# Patient Record
Sex: FEMALE | Race: WHITE | NOT HISPANIC OR LATINO | Employment: OTHER | URBAN - NONMETROPOLITAN AREA
[De-identification: names, ages, dates, MRNs, and addresses within clinical notes are randomized per-mention and may not be internally consistent; named-entity substitution may affect disease eponyms.]

---

## 2023-04-04 PROBLEM — D16.21 OSTEOCHONDROMA OF RIGHT FEMUR: Status: ACTIVE | Noted: 2023-04-04

## 2023-04-04 PROBLEM — I48.0 AF (PAROXYSMAL ATRIAL FIBRILLATION) (MULTI): Status: ACTIVE | Noted: 2023-04-04

## 2023-04-04 PROBLEM — R55 VASOVAGAL SYNCOPE: Status: ACTIVE | Noted: 2023-04-04

## 2023-04-04 PROBLEM — L60.3 DYSTROPHIC NAIL: Status: ACTIVE | Noted: 2023-04-04

## 2023-04-04 PROBLEM — M79.604 BILATERAL LEG AND FOOT PAIN: Status: ACTIVE | Noted: 2023-04-04

## 2023-04-04 PROBLEM — R63.5 EXCESSIVE WEIGHT GAIN: Status: ACTIVE | Noted: 2023-04-04

## 2023-04-04 PROBLEM — G89.29 CHRONIC PAIN OF RIGHT KNEE: Status: ACTIVE | Noted: 2023-04-04

## 2023-04-04 PROBLEM — R10.9 RIGHT FLANK PAIN: Status: ACTIVE | Noted: 2023-04-04

## 2023-04-04 PROBLEM — I87.8 STASIS, VENOUS: Status: ACTIVE | Noted: 2023-04-04

## 2023-04-04 PROBLEM — M19.91 PRIMARY OSTEOARTHRITIS: Status: ACTIVE | Noted: 2023-04-04

## 2023-04-04 PROBLEM — M25.561 CHRONIC PAIN OF RIGHT KNEE: Status: ACTIVE | Noted: 2023-04-04

## 2023-04-04 PROBLEM — F32.A DEPRESSION: Status: ACTIVE | Noted: 2023-04-04

## 2023-04-04 PROBLEM — H27.02 APHAKIA OF LEFT EYE: Status: ACTIVE | Noted: 2023-04-04

## 2023-04-04 PROBLEM — H33.21 RIGHT RETINAL DETACHMENT: Status: ACTIVE | Noted: 2023-04-04

## 2023-04-04 PROBLEM — R53.83 FATIGUE: Status: ACTIVE | Noted: 2023-04-04

## 2023-04-04 PROBLEM — M23.91 INTERNAL DERANGEMENT OF RIGHT KNEE: Status: ACTIVE | Noted: 2023-04-04

## 2023-04-04 PROBLEM — M79.605 BILATERAL LEG AND FOOT PAIN: Status: ACTIVE | Noted: 2023-04-04

## 2023-04-04 PROBLEM — B35.1 MYCOTIC TOENAILS: Status: ACTIVE | Noted: 2023-04-04

## 2023-04-04 PROBLEM — I10 ESSENTIAL HYPERTENSION: Status: ACTIVE | Noted: 2023-04-04

## 2023-04-04 PROBLEM — I35.0 AORTIC VALVE STENOSIS, MILD: Status: ACTIVE | Noted: 2023-04-04

## 2023-04-04 PROBLEM — M79.672 BILATERAL LEG AND FOOT PAIN: Status: ACTIVE | Noted: 2023-04-04

## 2023-04-04 PROBLEM — R01.1 SYSTOLIC MURMUR: Status: ACTIVE | Noted: 2023-04-04

## 2023-04-04 PROBLEM — M79.671 BILATERAL LEG AND FOOT PAIN: Status: ACTIVE | Noted: 2023-04-04

## 2023-04-04 PROBLEM — D64.9 ANEMIA: Status: ACTIVE | Noted: 2023-04-04

## 2023-04-04 RX ORDER — CITALOPRAM 40 MG/1
1 TABLET, FILM COATED ORAL DAILY
COMMUNITY
Start: 2014-07-11 | End: 2023-07-24

## 2023-04-04 RX ORDER — HYDROCHLOROTHIAZIDE 25 MG/1
1 TABLET ORAL DAILY
COMMUNITY
Start: 2014-12-09 | End: 2023-04-20

## 2023-04-04 RX ORDER — LATANOPROST 50 UG/ML
SOLUTION/ DROPS OPHTHALMIC
COMMUNITY
Start: 2021-08-24

## 2023-04-04 RX ORDER — FERROUS SULFATE 325(65) MG
65 TABLET ORAL DAILY
COMMUNITY
End: 2024-06-10 | Stop reason: WASHOUT

## 2023-04-04 RX ORDER — METOPROLOL TARTRATE 25 MG/1
0.5 TABLET, FILM COATED ORAL 2 TIMES DAILY
COMMUNITY
End: 2023-07-21

## 2023-04-11 ENCOUNTER — OFFICE VISIT (OUTPATIENT)
Dept: PRIMARY CARE | Facility: CLINIC | Age: 73
End: 2023-04-11
Payer: MEDICARE

## 2023-04-11 VITALS
OXYGEN SATURATION: 97 % | WEIGHT: 181 LBS | HEART RATE: 77 BPM | SYSTOLIC BLOOD PRESSURE: 116 MMHG | TEMPERATURE: 98.4 F | DIASTOLIC BLOOD PRESSURE: 62 MMHG | HEIGHT: 66 IN | BODY MASS INDEX: 29.09 KG/M2

## 2023-04-11 DIAGNOSIS — R73.9 HYPERGLYCEMIA: ICD-10-CM

## 2023-04-11 DIAGNOSIS — Z00.00 ROUTINE GENERAL MEDICAL EXAMINATION AT HEALTH CARE FACILITY: ICD-10-CM

## 2023-04-11 DIAGNOSIS — Z00.00 MEDICARE ANNUAL WELLNESS VISIT, SUBSEQUENT: Primary | ICD-10-CM

## 2023-04-11 LAB — POC HEMOGLOBIN A1C: 5.3 % (ref 4.2–6.5)

## 2023-04-11 PROCEDURE — 36416 COLLJ CAPILLARY BLOOD SPEC: CPT | Performed by: FAMILY MEDICINE

## 2023-04-11 PROCEDURE — 3078F DIAST BP <80 MM HG: CPT | Performed by: FAMILY MEDICINE

## 2023-04-11 PROCEDURE — 1036F TOBACCO NON-USER: CPT | Performed by: FAMILY MEDICINE

## 2023-04-11 PROCEDURE — 3074F SYST BP LT 130 MM HG: CPT | Performed by: FAMILY MEDICINE

## 2023-04-11 PROCEDURE — 1159F MED LIST DOCD IN RCRD: CPT | Performed by: FAMILY MEDICINE

## 2023-04-11 PROCEDURE — G0439 PPPS, SUBSEQ VISIT: HCPCS | Performed by: FAMILY MEDICINE

## 2023-04-11 PROCEDURE — 1170F FXNL STATUS ASSESSED: CPT | Performed by: FAMILY MEDICINE

## 2023-04-11 PROCEDURE — 83036 HEMOGLOBIN GLYCOSYLATED A1C: CPT | Performed by: FAMILY MEDICINE

## 2023-04-11 RX ORDER — ACETAMINOPHEN 325 MG/1
TABLET ORAL EVERY 4 HOURS PRN
COMMUNITY
Start: 2021-03-05

## 2023-04-11 ASSESSMENT — ENCOUNTER SYMPTOMS
OCCASIONAL FEELINGS OF UNSTEADINESS: 0
DEPRESSION: 0
LOSS OF SENSATION IN FEET: 0
ARTHRALGIAS: 1

## 2023-04-11 ASSESSMENT — ACTIVITIES OF DAILY LIVING (ADL)
GROCERY_SHOPPING: INDEPENDENT
DRESSING: INDEPENDENT
MANAGING_FINANCES: INDEPENDENT
BATHING: INDEPENDENT
DOING_HOUSEWORK: INDEPENDENT
TAKING_MEDICATION: INDEPENDENT

## 2023-04-11 ASSESSMENT — PATIENT HEALTH QUESTIONNAIRE - PHQ9
1. LITTLE INTEREST OR PLEASURE IN DOING THINGS: NOT AT ALL
2. FEELING DOWN, DEPRESSED OR HOPELESS: SEVERAL DAYS
SUM OF ALL RESPONSES TO PHQ9 QUESTIONS 1 AND 2: 1
10. IF YOU CHECKED OFF ANY PROBLEMS, HOW DIFFICULT HAVE THESE PROBLEMS MADE IT FOR YOU TO DO YOUR WORK, TAKE CARE OF THINGS AT HOME, OR GET ALONG WITH OTHER PEOPLE: SOMEWHAT DIFFICULT

## 2023-04-11 NOTE — PROGRESS NOTES
"Subjective   Reason for Visit: Leann Maradiaga is an 72 y.o. female here for a Medicare Wellness visit.     Past Medical, Surgical, and Family History reviewed and updated in chart.    Reviewed all medications by prescribing practitioner or clinical pharmacist (such as prescriptions, OTCs, herbal therapies and supplements) and documented in the medical record.    HPI STILL FOLLOWS WITH CARDIOLOGY WITH HER A FIB AND HER AORTIC STENOSIS     Patient Care Team:  Lesley Spears DO as PCP - General  Lseley Spears DO as PCP - [employer] PCP     Review of Systems   Eyes:  Positive for visual disturbance.        S/P CATARACT SURGERY HAS RETAINED LENS    All other systems reviewed and are negative.      Objective   Vitals:  /62   Pulse 77   Temp 36.9 °C (98.4 °F)   Ht 1.664 m (5' 5.5\")   Wt 82.1 kg (181 lb)   SpO2 97%   BMI 29.66 kg/m²       Physical Exam  Vitals reviewed.   Constitutional:       Appearance: Normal appearance. She is obese.   HENT:      Head: Normocephalic.   Eyes:      Comments: LEFT EYE WITH PTOSIS AND WILL GET SURGERY AND POOR VISION   Cardiovascular:      Rate and Rhythm: Normal rate.      Heart sounds: Murmur heard.   Pulmonary:      Effort: Pulmonary effort is normal.      Breath sounds: Normal breath sounds.   Abdominal:      Comments: PROTUBERANT NO MASS    Musculoskeletal:         General: Deformity present. Normal range of motion.      Comments: RIGHT KNEE DID NOT REHAB WELL AFTER TKR   Skin:     General: Skin is warm and dry.   Neurological:      General: No focal deficit present.      Mental Status: She is alert and oriented to person, place, and time.   Psychiatric:         Mood and Affect: Mood normal.      Comments: \"JUST KEEPS GOING\"       Assessment/Plan   Problem List Items Addressed This Visit    None         "

## 2023-04-11 NOTE — PROGRESS NOTES
"Subjective   Reason for Visit: Leann Maradiaga is an 72 y.o. female here for a Medicare Wellness visit.     Past Medical, Surgical, and Family History reviewed and updated in chart.    Reviewed all medications by prescribing practitioner or clinical pharmacist (such as prescriptions, OTCs, herbal therapies and supplements) and documented in the medical record.    HPI HERE FOR YEARLY  DOING WELL WITH A FIB STABLE AND FOLLOWS WITH CARDIOLOGY ABOUT THIS AND HER AORTIC STENOSIS     Patient Care Team:  Lesley Spears DO as PCP - General  Lesley Spears DO as PCP - [employer] PCP     Review of Systems   Eyes:  Positive for visual disturbance.   Musculoskeletal:  Positive for arthralgias.        FAILED TKR   All other systems reviewed and are negative.      Objective   Vitals:  /62   Pulse 77   Temp 36.9 °C (98.4 °F)   Ht 1.664 m (5' 5.5\")   Wt 82.1 kg (181 lb)   SpO2 97%   BMI 29.66 kg/m²       Physical Exam  Constitutional:       Appearance: Normal appearance. She is obese.   Eyes:      Comments: PTOSIS LEFT EYE LID AND POOR VISION    Cardiovascular:      Rate and Rhythm: Rhythm irregular.      Heart sounds: Murmur heard.   Pulmonary:      Effort: Pulmonary effort is normal.      Breath sounds: Normal breath sounds.   Abdominal:      Comments: PROTUBERANT NO MASS    Musculoskeletal:         General: Deformity present.      Comments: S/P RIGHT KNEE SURGERY DOES NOT HAVE FULL EXTENSION    Skin:     General: Skin is warm and dry.   Neurological:      General: No focal deficit present.      Mental Status: She is alert.   Psychiatric:         Mood and Affect: Mood normal.      Comments: \"HANGING IN THERE \", SHE SAYS          Assessment/Plan   Problem List Items Addressed This Visit          Other    Medicare annual wellness visit, subsequent - Primary     Other Visit Diagnoses       Hyperglycemia        Routine general medical examination at health care facility                   "

## 2023-07-19 ENCOUNTER — OFFICE VISIT (OUTPATIENT)
Dept: PRIMARY CARE | Facility: CLINIC | Age: 73
End: 2023-07-19
Payer: MEDICARE

## 2023-07-19 VITALS
TEMPERATURE: 97.9 F | BODY MASS INDEX: 31.12 KG/M2 | WEIGHT: 187 LBS | SYSTOLIC BLOOD PRESSURE: 110 MMHG | HEART RATE: 66 BPM | DIASTOLIC BLOOD PRESSURE: 74 MMHG | OXYGEN SATURATION: 98 %

## 2023-07-19 DIAGNOSIS — H02.006 ENTROPION AND TRICHIASIS OF EYELID, LEFT: Primary | ICD-10-CM

## 2023-07-19 DIAGNOSIS — Z00.8 ENCOUNTER FOR MEDICAL CLEARANCE FOR PATIENT HOLD: ICD-10-CM

## 2023-07-19 PROCEDURE — 3078F DIAST BP <80 MM HG: CPT | Performed by: FAMILY MEDICINE

## 2023-07-19 PROCEDURE — 1159F MED LIST DOCD IN RCRD: CPT | Performed by: FAMILY MEDICINE

## 2023-07-19 PROCEDURE — 3074F SYST BP LT 130 MM HG: CPT | Performed by: FAMILY MEDICINE

## 2023-07-19 PROCEDURE — 99214 OFFICE O/P EST MOD 30 MIN: CPT | Performed by: FAMILY MEDICINE

## 2023-07-19 PROCEDURE — 1036F TOBACCO NON-USER: CPT | Performed by: FAMILY MEDICINE

## 2023-07-19 RX ORDER — ERYTHROMYCIN 5 MG/G
OINTMENT OPHTHALMIC
COMMUNITY
Start: 2023-07-19 | End: 2024-03-08 | Stop reason: WASHOUT

## 2023-07-19 ASSESSMENT — ENCOUNTER SYMPTOMS
COUGH: 0
DIZZINESS: 0
NUMBNESS: 0
RHINORRHEA: 0
JOINT SWELLING: 0
SORE THROAT: 0
WHEEZING: 0
ABDOMINAL PAIN: 0
EYE ITCHING: 0
PALPITATIONS: 1
FLANK PAIN: 0
SINUS PRESSURE: 0
MYALGIAS: 0
LIGHT-HEADEDNESS: 0
ARTHRALGIAS: 0
ACTIVITY CHANGE: 0
EYE DISCHARGE: 1
DYSURIA: 0
VOMITING: 0
NAUSEA: 0
HEMATURIA: 0
SLEEP DISTURBANCE: 0
APPETITE CHANGE: 0
DYSPHORIC MOOD: 0
CONSTIPATION: 0
DIARRHEA: 0
HEADACHES: 0
EYE REDNESS: 1
UNEXPECTED WEIGHT CHANGE: 0
SHORTNESS OF BREATH: 0
NERVOUS/ANXIOUS: 0
WEAKNESS: 0
BLOOD IN STOOL: 0
FEVER: 0

## 2023-07-19 NOTE — PROGRESS NOTES
Subjective   Patient ID: Leann Maradiaga is a 73 y.o. female who presents for Pre-op Exam (L EYELID REPAIR ON 7/25/23 WITH  AT [facility]).    HPI PATIENT HERE FOR MEDICAL CLEARANCE FOR BLEPHAROPLASTY /ENTROPION SEVERE LEFT   HAD ISSUES WITH HER CATARACT SURGERY LAST YEAR     Review of Systems   Constitutional:  Negative for activity change, appetite change, fever and unexpected weight change.   HENT:  Negative for congestion, ear pain, postnasal drip, rhinorrhea, sinus pressure and sore throat.    Eyes:  Positive for discharge, redness and visual disturbance. Negative for itching.        ENTROPION LEFT LOWER EYE LID    Respiratory:  Negative for cough, shortness of breath and wheezing.    Cardiovascular:  Positive for palpitations. Negative for chest pain and leg swelling.   Gastrointestinal:  Negative for abdominal pain, blood in stool, constipation, diarrhea, nausea and vomiting.   Endocrine: Negative for cold intolerance, heat intolerance and polyuria.   Genitourinary:  Negative for dysuria, flank pain and hematuria.   Musculoskeletal:  Negative for arthralgias, gait problem, joint swelling and myalgias.   Skin:  Negative for rash.   Allergic/Immunologic: Negative for environmental allergies and food allergies.   Neurological:  Negative for dizziness, syncope, weakness, light-headedness, numbness and headaches.   Psychiatric/Behavioral:  Negative for dysphoric mood and sleep disturbance. The patient is not nervous/anxious.        Objective   /74   Pulse 66   Temp 36.6 °C (97.9 °F)   Wt 84.8 kg (187 lb)   SpO2 98%   BMI 31.12 kg/m²     Physical Exam  Constitutional:       Appearance: Normal appearance.   HENT:      Head: Normocephalic and atraumatic.      Nose: Nose normal.      Mouth/Throat:      Mouth: Mucous membranes are moist.   Eyes:      General:         Left eye: Discharge present.     Extraocular Movements: Extraocular movements intact.      Pupils: Pupils are equal,  round, and reactive to light.      Comments: SEVERE ENTROPION OF LEFT LOWER EYE LID   LASHES INVERTED AND IRRITATE THE EYE   PAIN AND REDNESS    Cardiovascular:      Rate and Rhythm: Normal rate and regular rhythm.   Pulmonary:      Effort: Pulmonary effort is normal.      Breath sounds: Normal breath sounds.   Abdominal:      Palpations: Abdomen is soft.   Musculoskeletal:         General: Normal range of motion.      Cervical back: Normal range of motion and neck supple.   Skin:     General: Skin is warm and dry.   Neurological:      General: No focal deficit present.      Mental Status: She is alert and oriented to person, place, and time.   Psychiatric:         Mood and Affect: Mood normal.         Behavior: Behavior normal.         Assessment/Plan   Diagnoses and all orders for this visit:  Entropion and trichiasis of eyelid, left

## 2023-07-20 DIAGNOSIS — I48.0 PAROXYSMAL ATRIAL FIBRILLATION (MULTI): ICD-10-CM

## 2023-07-21 DIAGNOSIS — F32.A DEPRESSION, UNSPECIFIED: ICD-10-CM

## 2023-07-21 DIAGNOSIS — I10 ESSENTIAL (PRIMARY) HYPERTENSION: ICD-10-CM

## 2023-07-21 RX ORDER — METOPROLOL TARTRATE 25 MG/1
12.5 TABLET, FILM COATED ORAL 2 TIMES DAILY
Qty: 30 TABLET | Refills: 6 | Status: SHIPPED | OUTPATIENT
Start: 2023-07-21 | End: 2023-07-24 | Stop reason: SDUPTHER

## 2023-07-24 DIAGNOSIS — I48.0 PAROXYSMAL ATRIAL FIBRILLATION (MULTI): ICD-10-CM

## 2023-07-24 RX ORDER — CITALOPRAM 40 MG/1
40 TABLET, FILM COATED ORAL DAILY
Qty: 90 TABLET | Refills: 1 | Status: SHIPPED | OUTPATIENT
Start: 2023-07-24 | End: 2024-03-19

## 2023-07-24 RX ORDER — HYDROCHLOROTHIAZIDE 25 MG/1
TABLET ORAL
Qty: 90 TABLET | Refills: 1 | Status: SHIPPED | OUTPATIENT
Start: 2023-07-24 | End: 2024-03-19

## 2023-07-25 RX ORDER — METOPROLOL TARTRATE 25 MG/1
12.5 TABLET, FILM COATED ORAL 2 TIMES DAILY
Qty: 30 TABLET | Refills: 6 | Status: SHIPPED | OUTPATIENT
Start: 2023-07-25 | End: 2024-04-15

## 2023-11-01 ENCOUNTER — OFFICE VISIT (OUTPATIENT)
Dept: PODIATRY | Facility: CLINIC | Age: 73
End: 2023-11-01
Payer: MEDICARE

## 2023-11-01 DIAGNOSIS — M79.605 BILATERAL LEG AND FOOT PAIN: Primary | ICD-10-CM

## 2023-11-01 DIAGNOSIS — M79.671 BILATERAL LEG AND FOOT PAIN: Primary | ICD-10-CM

## 2023-11-01 DIAGNOSIS — I87.8 STASIS, VENOUS: ICD-10-CM

## 2023-11-01 DIAGNOSIS — M79.604 BILATERAL LEG AND FOOT PAIN: Primary | ICD-10-CM

## 2023-11-01 DIAGNOSIS — B35.1 MYCOTIC TOENAILS: ICD-10-CM

## 2023-11-01 DIAGNOSIS — M79.672 BILATERAL LEG AND FOOT PAIN: Primary | ICD-10-CM

## 2023-11-01 PROCEDURE — 1159F MED LIST DOCD IN RCRD: CPT | Performed by: PODIATRIST

## 2023-11-01 PROCEDURE — 3074F SYST BP LT 130 MM HG: CPT | Performed by: PODIATRIST

## 2023-11-01 PROCEDURE — 99213 OFFICE O/P EST LOW 20 MIN: CPT | Performed by: PODIATRIST

## 2023-11-01 PROCEDURE — 1036F TOBACCO NON-USER: CPT | Performed by: PODIATRIST

## 2023-11-01 PROCEDURE — 3078F DIAST BP <80 MM HG: CPT | Performed by: PODIATRIST

## 2023-11-09 NOTE — PROGRESS NOTES
This is a 73 y.o. female est patient for foot exam    History of Present Illness:   This 73 year old female presents to clinic today for foot concerns. Patient denies being a diabetic but is unable to safely trim nails on own. Stats they are too thick to safely cut. Patient states they are tender in shoe gear. Denies trauma to area, no other pedal complaints at this time.    Past Medical History  Past Medical History:   Diagnosis Date    Acute cystitis without hematuria 08/28/2020    Acute cystitis without hematuria    Acute nasopharyngitis (common cold) 12/06/2016    Acute rhinitis    Contusion of lower back and pelvis, initial encounter 02/15/2017    Contusion of buttock, initial encounter    Encounter for other general examination 08/25/2020    Encounter for medical clearance for patient hold    Low back pain, unspecified 02/15/2017    Acute low back pain due to trauma       Medications and Allergies have been reviewed.    Review Of Systems:  GENERAL: No weight loss, malaise or fevers.  HEENT: Negative for frequent or significant headaches,   RESPIRATORY: Negative for cough, wheezing or shortness of breath.  CARDIOVASCULAR: Negative for chest pain, leg swelling or palpitations.    Physical Exam:  Patient is a pleasant, cooperative, well developed 73 y.o.  adult female. The patient is alert and oriented to time, place and person.   Patient has normal affect and mood.    Examination of Both Lower Extremities:   Vascular exam: Dorsalis pedis and Posterior Tibial pulses palpable 2/4 bilateral. Capillary refill time less than 3 seconds b/l. Hair growth noted to digits. No edema noted. Skin temp warm to warm from proximal to distal b/l. No varicosities noted.     Neurologic exam: Vibratory, protective and proprioception intact b/l. No neurologic deficits noted.      Musculoskeletal exam: Muscle strength 5/5 for all major muscle groups tested in the lower extremity b/l. No gross deformities noted b/l.      Dermatologic  exam: Nails 1-5 b/l are thickened, elongated and discolored with the presence of subungual debris, tender to palpation. Webspaces 1-4 b/l are clean, dry and intact. No primary or secondary skin lesions noted. No open lesions or wounds noted at this time .     1. Bilateral leg and foot pain        2. Mycotic toenails        3. Stasis, venous            Patient examined and evaluated.   Patient educated on proper foot care and education dispensed.  Nails 1-5 b/l were debrided in thickness and length with nail cutting forceps.  Patient to follow up prn.  Patient was in agreement to this plan. All questions answered.    Zoila Bernal DPM  [phone]  [address]  Fax: [phone]

## 2024-02-13 ENCOUNTER — PROCEDURE VISIT (OUTPATIENT)
Dept: PODIATRY | Facility: CLINIC | Age: 74
End: 2024-02-13
Payer: MEDICARE

## 2024-02-13 DIAGNOSIS — M79.605 BILATERAL LEG AND FOOT PAIN: Primary | ICD-10-CM

## 2024-02-13 DIAGNOSIS — B35.1 MYCOTIC TOENAILS: ICD-10-CM

## 2024-02-13 DIAGNOSIS — M79.604 BILATERAL LEG AND FOOT PAIN: Primary | ICD-10-CM

## 2024-02-13 DIAGNOSIS — I87.8 STASIS, VENOUS: ICD-10-CM

## 2024-02-13 DIAGNOSIS — M79.672 BILATERAL LEG AND FOOT PAIN: Primary | ICD-10-CM

## 2024-02-13 DIAGNOSIS — M79.671 BILATERAL LEG AND FOOT PAIN: Primary | ICD-10-CM

## 2024-02-13 PROCEDURE — 11721 DEBRIDE NAIL 6 OR MORE: CPT | Performed by: PODIATRIST

## 2024-02-13 NOTE — PROGRESS NOTES
History of Present Illness:   This 73 year old female presents to clinic today for foot concerns. Patient denies being a diabetic but is unable to safely trim nails on own. States they are too thick to safely cut. Patient states they are tender in shoe gear. Denies trauma to area, no other pedal complaints at this time.    Past Medical History  Past Medical History:   Diagnosis Date    Acute cystitis without hematuria 08/28/2020    Acute cystitis without hematuria    Acute nasopharyngitis (common cold) 12/06/2016    Acute rhinitis    Contusion of lower back and pelvis, initial encounter 02/15/2017    Contusion of buttock, initial encounter    Encounter for other general examination 08/25/2020    Encounter for medical clearance for patient hold    Low back pain, unspecified 02/15/2017    Acute low back pain due to trauma       Medications and Allergies have been reviewed.    Review Of Systems:  GENERAL: No weight loss, malaise or fevers.  HEENT: Negative for frequent or significant headaches,   RESPIRATORY: Negative for cough, wheezing or shortness of breath.  CARDIOVASCULAR: Negative for chest pain, leg swelling or palpitations.    Physical Exam:  Patient is a pleasant, cooperative, well developed 73 y.o.  adult female. The patient is alert and oriented to time, place and person.   Patient has normal affect and mood.    Examination of Both Lower Extremities:   Vascular exam: Dorsalis pedis and Posterior Tibial pulses palpable 2/4 bilateral. Capillary refill time less than 3 seconds b/l. Hair growth noted to digits. No edema noted. Skin temp warm to warm from proximal to distal b/l. No varicosities noted.     Neurologic exam: Vibratory, protective and proprioception intact b/l. No neurologic deficits noted.      Musculoskeletal exam: Muscle strength 5/5 for all major muscle groups tested in the lower extremity b/l. No gross deformities noted b/l.      Dermatologic exam: Nails 1-5 b/l are thickened, elongated and  discolored with the presence of subungual debris, tender to palpation. Webspaces 1-4 b/l are clean, dry and intact. No primary or secondary skin lesions noted. No open lesions or wounds noted at this time .     1. Bilateral leg and foot pain        2. Mycotic toenails        3. Stasis, venous            Patient examined and evaluated.   Patient educated on proper foot care and education dispensed.  Nails 1-5 b/l were debrided in thickness and length with nail cutting forceps.  Patient to follow up prn.  Patient was in agreement to this plan. All questions answered.    Last appt with Dr. Spears 7/19/23    Zoila Bernal, JEAN  [phone]  [facility] [address]  Fax: [phone]

## 2024-03-01 ENCOUNTER — HOSPITAL ENCOUNTER (OUTPATIENT)
Dept: RADIOLOGY | Facility: CLINIC | Age: 74
Discharge: HOME | End: 2024-03-01
Payer: MEDICARE

## 2024-03-01 ENCOUNTER — OFFICE VISIT (OUTPATIENT)
Dept: PRIMARY CARE | Facility: CLINIC | Age: 74
End: 2024-03-01
Payer: MEDICARE

## 2024-03-01 VITALS
WEIGHT: 190 LBS | HEART RATE: 83 BPM | DIASTOLIC BLOOD PRESSURE: 78 MMHG | BODY MASS INDEX: 31.62 KG/M2 | OXYGEN SATURATION: 96 % | TEMPERATURE: 97.4 F | SYSTOLIC BLOOD PRESSURE: 130 MMHG

## 2024-03-01 DIAGNOSIS — R05.1 ACUTE COUGH: Primary | ICD-10-CM

## 2024-03-01 DIAGNOSIS — J06.9 ACUTE URI: ICD-10-CM

## 2024-03-01 LAB
FLUAV RNA RESP QL NAA+PROBE: NOT DETECTED
FLUBV RNA RESP QL NAA+PROBE: NOT DETECTED
RSV RNA RESP QL NAA+PROBE: DETECTED
SARS-COV-2 RNA RESP QL NAA+PROBE: NOT DETECTED

## 2024-03-01 PROCEDURE — 1159F MED LIST DOCD IN RCRD: CPT

## 2024-03-01 PROCEDURE — 87636 SARSCOV2 & INF A&B AMP PRB: CPT

## 2024-03-01 PROCEDURE — 3075F SYST BP GE 130 - 139MM HG: CPT

## 2024-03-01 PROCEDURE — 71046 X-RAY EXAM CHEST 2 VIEWS: CPT

## 2024-03-01 PROCEDURE — 87634 RSV DNA/RNA AMP PROBE: CPT

## 2024-03-01 PROCEDURE — 71046 X-RAY EXAM CHEST 2 VIEWS: CPT | Performed by: RADIOLOGY

## 2024-03-01 PROCEDURE — 1036F TOBACCO NON-USER: CPT

## 2024-03-01 PROCEDURE — 99214 OFFICE O/P EST MOD 30 MIN: CPT

## 2024-03-01 PROCEDURE — 3078F DIAST BP <80 MM HG: CPT

## 2024-03-01 RX ORDER — ALBUTEROL SULFATE 90 UG/1
2 AEROSOL, METERED RESPIRATORY (INHALATION) EVERY 4 HOURS PRN
Qty: 8 G | Refills: 5 | Status: SHIPPED | OUTPATIENT
Start: 2024-03-01 | End: 2024-06-10 | Stop reason: WASHOUT

## 2024-03-01 RX ORDER — AZITHROMYCIN 250 MG/1
TABLET, FILM COATED ORAL
Qty: 6 TABLET | Refills: 0 | Status: SHIPPED | OUTPATIENT
Start: 2024-03-01 | End: 2024-03-08 | Stop reason: WASHOUT

## 2024-03-01 ASSESSMENT — ENCOUNTER SYMPTOMS
APPETITE CHANGE: 1
WHEEZING: 1
ACTIVITY CHANGE: 1
FATIGUE: 1

## 2024-03-01 NOTE — LETTER
March 4, 2024     Shona Maradiaga  [address] [facility] [address]      Dear Ms. Maradiaga:    Below are the results from your recent visit:    Resulted Orders   XR chest 2 views    Narrative    Interpreted By:  Cheryl Pearl,   STUDY:  Chest, 2 views.      INDICATION:  Signs/Symptoms:wheezing.      COMPARISON:  02/28/2021.      ACCESSION NUMBER(S):  [id number]      ORDERING CLINICIAN:  GABI LUA      FINDINGS:  The cardiomediastinal silhouette size is within upper normal limits.      There is no focal consolidation, edema or pneumothorax.      No sizeable pleural effusion. There is a very large hiatal hernia  which limits evaluation of left lung base.        Impression    1. No acute cardiopulmonary process although evaluation of the left  lung base is somewhat limited due to presence of large hiatal hernia.      MACRO:  None.      Signed by: Cheryl Pearl 3/2/2024 10:35 AM  Dictation workstation:   [id number]     The test results came back negative. Please continue current treatment plan.     If you have any questions or concerns, please don't hesitate to call.         Sincerely,    SUKUMAR Rivreo

## 2024-03-01 NOTE — PROGRESS NOTES
"Subjective   Patient ID: Leann Maradiaga \"David" is a 73 y.o. female who presents for Sick Visit (Shona is here for a sick visit. She is having trouble breathing with runny nose and congestion. Has been wheezing. ).  Subjective  Shona Maradiaga is a 73 y.o. female who presents for evaluation of symptoms of a URI. Symptoms include achiness, congestion, sneezing, and wheezing. Onset of symptoms was a few days ago and has been gradually worsening since that time. Treatment to date: antihistamines and cough suppressants.    Objective  [unfilled]     Assessment/Plan  upper respiratory illness.    Discussed diagnosis and treatment of URI.  Suggested symptomatic OTC remedies.  Nasal saline spray for congestion.  Follow up as needed.          Vitals:    03/01/24 1117   BP: 130/78   Pulse: 83   Temp: 36.3 °C (97.4 °F)   SpO2: 96%       Review of Systems   Constitutional:  Positive for activity change, appetite change and fatigue.   Respiratory:  Positive for wheezing.    All other systems reviewed and are negative.      Objective   Physical Exam  Vitals and nursing note reviewed.   Constitutional:       Appearance: Normal appearance. She is ill-appearing.   Pulmonary:      Effort: No respiratory distress.      Breath sounds: Wheezing present.   Neurological:      Mental Status: She is alert.         Assessment/Plan   Problem List Items Addressed This Visit    None  Visit Diagnoses       Acute cough    -  Primary    Relevant Medications    azithromycin (Zithromax) 250 mg tablet    Other Relevant Orders    Sars-CoV-2 PCR    Influenza A, and B PCR    RSV PCR    Acute URI        Relevant Medications    albuterol (Ventolin HFA) 90 mcg/actuation inhaler    azithromycin (Zithromax) 250 mg tablet    Other Relevant Orders    XR chest 2 views                 Thank you for coming in today, please call my office if you have any concerns or questions.     Bob MCCAULEY, CNP  "

## 2024-03-01 NOTE — PATIENT INSTRUCTIONS
azithromycin  Albuterol for wheezing    Thank you for coming in today, if any questions or concerns arise, please call my office.   GARRICK Hollingsworth-CNP

## 2024-03-03 ENCOUNTER — TELEPHONE (OUTPATIENT)
Dept: PRIMARY CARE | Facility: CLINIC | Age: 74
End: 2024-03-03
Payer: MEDICARE

## 2024-03-03 DIAGNOSIS — R05.1 ACUTE COUGH: Primary | ICD-10-CM

## 2024-03-03 RX ORDER — CYCLOBENZAPRINE HCL 5 MG
5 TABLET ORAL NIGHTLY PRN
Qty: 5 TABLET | Refills: 0 | Status: SHIPPED | OUTPATIENT
Start: 2024-03-03 | End: 2024-03-04 | Stop reason: SDUPTHER

## 2024-03-03 RX ORDER — PREDNISONE 10 MG/1
10 TABLET ORAL 3 TIMES DAILY
Qty: 9 TABLET | Refills: 0 | Status: SHIPPED | OUTPATIENT
Start: 2024-03-03 | End: 2024-03-08 | Stop reason: WASHOUT

## 2024-03-04 ENCOUNTER — TELEPHONE (OUTPATIENT)
Dept: PRIMARY CARE | Facility: CLINIC | Age: 74
End: 2024-03-04

## 2024-03-04 ENCOUNTER — OFFICE VISIT (OUTPATIENT)
Dept: PRIMARY CARE | Facility: CLINIC | Age: 74
End: 2024-03-04
Payer: MEDICARE

## 2024-03-04 VITALS
BODY MASS INDEX: 31.62 KG/M2 | DIASTOLIC BLOOD PRESSURE: 80 MMHG | TEMPERATURE: 97.5 F | OXYGEN SATURATION: 95 % | WEIGHT: 190 LBS | SYSTOLIC BLOOD PRESSURE: 130 MMHG | HEART RATE: 87 BPM

## 2024-03-04 DIAGNOSIS — R05.1 ACUTE COUGH: ICD-10-CM

## 2024-03-04 DIAGNOSIS — M54.10 RADICULOPATHY, UNSPECIFIED SPINAL REGION: ICD-10-CM

## 2024-03-04 DIAGNOSIS — M25.512 ACUTE PAIN OF LEFT SHOULDER: Primary | ICD-10-CM

## 2024-03-04 DIAGNOSIS — J20.5 RSV BRONCHITIS: Primary | ICD-10-CM

## 2024-03-04 PROCEDURE — 1159F MED LIST DOCD IN RCRD: CPT

## 2024-03-04 PROCEDURE — 1036F TOBACCO NON-USER: CPT

## 2024-03-04 PROCEDURE — 99213 OFFICE O/P EST LOW 20 MIN: CPT

## 2024-03-04 PROCEDURE — 3075F SYST BP GE 130 - 139MM HG: CPT

## 2024-03-04 PROCEDURE — 3079F DIAST BP 80-89 MM HG: CPT

## 2024-03-04 RX ORDER — CYCLOBENZAPRINE HCL 5 MG
5 TABLET ORAL NIGHTLY PRN
Qty: 5 TABLET | Refills: 0 | Status: SHIPPED | OUTPATIENT
Start: 2024-03-04 | End: 2024-03-09

## 2024-03-04 NOTE — TELEPHONE ENCOUNTER
You sent Flexeril over for her but you used chronic cough for DX.  It needs a prior auth can you resend with correct DX?

## 2024-03-04 NOTE — PATIENT INSTRUCTIONS
Finish course of steroid pack  Flexeril for the arm at bedtime    Thank you for coming in today, if any questions or concerns arise, please call my office.   GARRICK Hollingsworth-CNP

## 2024-03-04 NOTE — TELEPHONE ENCOUNTER
----- Message from GENEVA Hollingsworth sent at 3/4/2024  8:28 AM EST -----  Results are normal, please notify the patient. Continue treatment as prescribed from weekend.

## 2024-03-04 NOTE — PROGRESS NOTES
"Subjective   Patient ID: Leann Maradiaga \"Shona\" is a 73 y.o. female who presents for Follow-up (Shona is here for a follow up of RSV. Saturday she covered her mouth with her left arm and is now in a lot of pain. Does have pain on the right side however mainly on her left. Is unable to lay down and coughing makes it worse. ).  RSV follow up  --started on Prednisone burst for 3 days in total    Left Arm pain  --forearm swelling with bruising  --pulses are good, color good.   --flexeril at bedtime          Vitals:    03/04/24 1109   BP: 130/80   Pulse: 87   Temp: 36.4 °C (97.5 °F)   SpO2: 95%       Review of Systems    Objective   Physical Exam  Cardiovascular:      Rate and Rhythm: Normal rate and regular rhythm.   Pulmonary:      Effort: No respiratory distress.      Breath sounds: Wheezing present.         Assessment/Plan   Problem List Items Addressed This Visit    None  Visit Diagnoses       RSV bronchitis    -  Primary    Radiculopathy, unspecified spinal region                     Thank you for coming in today, please call my office if you have any concerns or questions.     Bob MCCAULEY, CNP  "

## 2024-03-08 ENCOUNTER — HOSPITAL ENCOUNTER (EMERGENCY)
Facility: HOSPITAL | Age: 74
Discharge: HOME | End: 2024-03-08
Payer: MEDICARE

## 2024-03-08 ENCOUNTER — OFFICE VISIT (OUTPATIENT)
Dept: PRIMARY CARE | Facility: CLINIC | Age: 74
End: 2024-03-08
Payer: MEDICARE

## 2024-03-08 ENCOUNTER — APPOINTMENT (OUTPATIENT)
Dept: RADIOLOGY | Facility: HOSPITAL | Age: 74
End: 2024-03-08
Payer: MEDICARE

## 2024-03-08 VITALS
WEIGHT: 191 LBS | SYSTOLIC BLOOD PRESSURE: 124 MMHG | OXYGEN SATURATION: 97 % | HEART RATE: 75 BPM | BODY MASS INDEX: 31.78 KG/M2 | DIASTOLIC BLOOD PRESSURE: 82 MMHG | TEMPERATURE: 97.5 F

## 2024-03-08 VITALS
OXYGEN SATURATION: 95 % | TEMPERATURE: 97.9 F | DIASTOLIC BLOOD PRESSURE: 83 MMHG | HEIGHT: 66 IN | HEART RATE: 82 BPM | WEIGHT: 191 LBS | BODY MASS INDEX: 30.7 KG/M2 | SYSTOLIC BLOOD PRESSURE: 129 MMHG | RESPIRATION RATE: 14 BRPM

## 2024-03-08 DIAGNOSIS — M54.12 CERVICAL RADICULOPATHY: Primary | ICD-10-CM

## 2024-03-08 DIAGNOSIS — M79.602 PAIN IN BOTH UPPER EXTREMITIES: ICD-10-CM

## 2024-03-08 DIAGNOSIS — M79.601 PAIN IN BOTH UPPER EXTREMITIES: ICD-10-CM

## 2024-03-08 DIAGNOSIS — S40.022D ARM BRUISE, LEFT, SUBSEQUENT ENCOUNTER: ICD-10-CM

## 2024-03-08 DIAGNOSIS — J20.5 RSV BRONCHITIS: ICD-10-CM

## 2024-03-08 DIAGNOSIS — R01.1 SYSTOLIC MURMUR: Primary | ICD-10-CM

## 2024-03-08 PROBLEM — S40.022A TRAUMATIC ECCHYMOSIS OF LEFT UPPER ARM: Status: ACTIVE | Noted: 2024-03-08

## 2024-03-08 LAB
ALBUMIN SERPL BCP-MCNC: 3.9 G/DL (ref 3.4–5)
ALP SERPL-CCNC: 58 U/L (ref 33–136)
ALT SERPL W P-5'-P-CCNC: 9 U/L (ref 7–45)
ANION GAP SERPL CALC-SCNC: 10 MMOL/L (ref 10–20)
AST SERPL W P-5'-P-CCNC: 12 U/L (ref 9–39)
BASOPHILS # BLD AUTO: 0.02 X10*3/UL (ref 0–0.1)
BASOPHILS NFR BLD AUTO: 0.2 %
BILIRUB SERPL-MCNC: 0.8 MG/DL (ref 0–1.2)
BNP SERPL-MCNC: 84 PG/ML (ref 0–99)
BUN SERPL-MCNC: 29 MG/DL (ref 6–23)
CALCIUM SERPL-MCNC: 9.2 MG/DL (ref 8.6–10.3)
CARDIAC TROPONIN I PNL SERPL HS: 6 NG/L (ref 0–13)
CHLORIDE SERPL-SCNC: 105 MMOL/L (ref 98–107)
CO2 SERPL-SCNC: 29 MMOL/L (ref 21–32)
CREAT SERPL-MCNC: 0.83 MG/DL (ref 0.5–1.05)
EGFRCR SERPLBLD CKD-EPI 2021: 75 ML/MIN/1.73M*2
EOSINOPHIL # BLD AUTO: 0.2 X10*3/UL (ref 0–0.4)
EOSINOPHIL NFR BLD AUTO: 2.2 %
ERYTHROCYTE [DISTWIDTH] IN BLOOD BY AUTOMATED COUNT: 13.1 % (ref 11.5–14.5)
GLUCOSE SERPL-MCNC: 98 MG/DL (ref 74–99)
HCT VFR BLD AUTO: 38.8 % (ref 36–46)
HGB BLD-MCNC: 12.7 G/DL (ref 12–16)
IMM GRANULOCYTES # BLD AUTO: 0.03 X10*3/UL (ref 0–0.5)
IMM GRANULOCYTES NFR BLD AUTO: 0.3 % (ref 0–0.9)
INR PPP: 1 (ref 0.9–1.1)
LYMPHOCYTES # BLD AUTO: 2.79 X10*3/UL (ref 0.8–3)
LYMPHOCYTES NFR BLD AUTO: 30.4 %
MCH RBC QN AUTO: 29.5 PG (ref 26–34)
MCHC RBC AUTO-ENTMCNC: 32.7 G/DL (ref 32–36)
MCV RBC AUTO: 90 FL (ref 80–100)
MONOCYTES # BLD AUTO: 0.71 X10*3/UL (ref 0.05–0.8)
MONOCYTES NFR BLD AUTO: 7.7 %
NEUTROPHILS # BLD AUTO: 5.44 X10*3/UL (ref 1.6–5.5)
NEUTROPHILS NFR BLD AUTO: 59.2 %
NRBC BLD-RTO: 0 /100 WBCS (ref 0–0)
PLATELET # BLD AUTO: 304 X10*3/UL (ref 150–450)
POTASSIUM SERPL-SCNC: 4.1 MMOL/L (ref 3.5–5.3)
PROT SERPL-MCNC: 7.4 G/DL (ref 6.4–8.2)
PROTHROMBIN TIME: 11.2 SECONDS (ref 9.8–12.8)
RBC # BLD AUTO: 4.3 X10*6/UL (ref 4–5.2)
SODIUM SERPL-SCNC: 140 MMOL/L (ref 136–145)
WBC # BLD AUTO: 9.2 X10*3/UL (ref 4.4–11.3)

## 2024-03-08 PROCEDURE — 71046 X-RAY EXAM CHEST 2 VIEWS: CPT | Mod: FOREIGN READ | Performed by: RADIOLOGY

## 2024-03-08 PROCEDURE — 99284 EMERGENCY DEPT VISIT MOD MDM: CPT | Mod: 25

## 2024-03-08 PROCEDURE — 1159F MED LIST DOCD IN RCRD: CPT | Performed by: FAMILY MEDICINE

## 2024-03-08 PROCEDURE — 3074F SYST BP LT 130 MM HG: CPT | Performed by: FAMILY MEDICINE

## 2024-03-08 PROCEDURE — 36415 COLL VENOUS BLD VENIPUNCTURE: CPT

## 2024-03-08 PROCEDURE — 72125 CT NECK SPINE W/O DYE: CPT

## 2024-03-08 PROCEDURE — 71046 X-RAY EXAM CHEST 2 VIEWS: CPT

## 2024-03-08 PROCEDURE — 83880 ASSAY OF NATRIURETIC PEPTIDE: CPT | Performed by: PHYSICIAN ASSISTANT

## 2024-03-08 PROCEDURE — 1036F TOBACCO NON-USER: CPT | Performed by: FAMILY MEDICINE

## 2024-03-08 PROCEDURE — 3079F DIAST BP 80-89 MM HG: CPT | Performed by: FAMILY MEDICINE

## 2024-03-08 PROCEDURE — 72125 CT NECK SPINE W/O DYE: CPT | Performed by: RADIOLOGY

## 2024-03-08 PROCEDURE — 85025 COMPLETE CBC W/AUTO DIFF WBC: CPT | Performed by: PHYSICIAN ASSISTANT

## 2024-03-08 PROCEDURE — 85610 PROTHROMBIN TIME: CPT | Performed by: PHYSICIAN ASSISTANT

## 2024-03-08 PROCEDURE — 80053 COMPREHEN METABOLIC PANEL: CPT | Performed by: PHYSICIAN ASSISTANT

## 2024-03-08 PROCEDURE — 84484 ASSAY OF TROPONIN QUANT: CPT | Performed by: PHYSICIAN ASSISTANT

## 2024-03-08 PROCEDURE — 99214 OFFICE O/P EST MOD 30 MIN: CPT | Performed by: FAMILY MEDICINE

## 2024-03-08 RX ORDER — PREDNISONE 20 MG/1
TABLET ORAL
Qty: 18 TABLET | Refills: 0 | Status: SHIPPED | OUTPATIENT
Start: 2024-03-08 | End: 2024-05-29 | Stop reason: WASHOUT

## 2024-03-08 ASSESSMENT — ENCOUNTER SYMPTOMS
SLEEP DISTURBANCE: 0
NUMBNESS: 0
WHEEZING: 1
SINUS PRESSURE: 0
SORE THROAT: 0
DYSURIA: 0
HEMATURIA: 0
VOMITING: 0
EYE DISCHARGE: 0
HEADACHES: 0
UNEXPECTED WEIGHT CHANGE: 0
DIARRHEA: 0
DYSPHORIC MOOD: 0
FLANK PAIN: 0
MYALGIAS: 0
ABDOMINAL PAIN: 0
JOINT SWELLING: 0
PALPITATIONS: 0
NAUSEA: 0
EYE ITCHING: 0
CONSTIPATION: 0
ARTHRALGIAS: 0
FEVER: 0
BLOOD IN STOOL: 0
ACTIVITY CHANGE: 0
NERVOUS/ANXIOUS: 0
SHORTNESS OF BREATH: 1
WEAKNESS: 0
APPETITE CHANGE: 0
RHINORRHEA: 0
DIZZINESS: 0
LIGHT-HEADEDNESS: 0
COUGH: 1

## 2024-03-08 ASSESSMENT — PAIN DESCRIPTION - ORIENTATION: ORIENTATION: RIGHT;LEFT

## 2024-03-08 ASSESSMENT — PAIN DESCRIPTION - PAIN TYPE: TYPE: ACUTE PAIN

## 2024-03-08 ASSESSMENT — PAIN DESCRIPTION - DESCRIPTORS: DESCRIPTORS: THROBBING

## 2024-03-08 ASSESSMENT — PAIN DESCRIPTION - FREQUENCY: FREQUENCY: CONSTANT/CONTINUOUS

## 2024-03-08 ASSESSMENT — PAIN SCALES - GENERAL: PAINLEVEL_OUTOF10: 8

## 2024-03-08 ASSESSMENT — PAIN DESCRIPTION - LOCATION: LOCATION: ARM

## 2024-03-08 ASSESSMENT — PAIN - FUNCTIONAL ASSESSMENT: PAIN_FUNCTIONAL_ASSESSMENT: 0-10

## 2024-03-08 NOTE — PROGRESS NOTES
Subjective   Patient ID: Shona Maradiaga is a 73 y.o. female who presents for Arm Pain (Bilateral arm aching and heaviness. Since her cough started- no injury.  Bruising to the L arm- she states she didn't bump, get labs drawn, or have any kind of injury to the arm, but the bruise just showed up.).    HPI EVALUATED LAST WEEK AND DUE TO DIFFUSE WHEEZING TREATED WITH DECADRON   THE ARM PAIN CONTINUES   WHEEZING AND COUGH ARE BETTER     Review of Systems   Constitutional:  Negative for activity change, appetite change, fever and unexpected weight change.   HENT:  Negative for congestion, ear pain, postnasal drip, rhinorrhea, sinus pressure and sore throat.    Eyes:  Negative for discharge, itching and visual disturbance.   Respiratory:  Positive for cough, shortness of breath and wheezing.    Cardiovascular:  Negative for chest pain, palpitations and leg swelling.   Gastrointestinal:  Negative for abdominal pain, blood in stool, constipation, diarrhea, nausea and vomiting.   Endocrine: Negative for cold intolerance, heat intolerance and polyuria.   Genitourinary:  Negative for dysuria, flank pain and hematuria.   Musculoskeletal:  Negative for arthralgias, gait problem, joint swelling and myalgias.        BOTH ARMS TERRIBLE PAIN   PULSES ARE GOOD  PAIN TO PALPATE OVER THE SUPRACLAVICULAREA ON THE LEFT   ECCHYMOSIS DOWN THE ANTERIOR AND POSTERIOR ASPECT OF THE LEFT ARM    Skin:  Negative for rash.   Allergic/Immunologic: Negative for environmental allergies and food allergies.   Neurological:  Negative for dizziness, syncope, weakness, light-headedness, numbness and headaches.   Psychiatric/Behavioral:  Negative for dysphoric mood and sleep disturbance. The patient is not nervous/anxious.        Objective   /82   Pulse 75   Temp 36.4 °C (97.5 °F)   Wt 86.6 kg (191 lb)   SpO2 97%   BMI 31.78 kg/m²     Physical Exam  Vitals and nursing note reviewed.   Constitutional:       Appearance: Normal appearance. She is  ill-appearing.   HENT:      Head: Normocephalic.   Cardiovascular:      Rate and Rhythm: Normal rate and regular rhythm.      Pulses: Normal pulses.      Heart sounds: Murmur heard.      No friction rub. No gallop.      Comments: HOLOSYSTOLIC MURMUR (KNOWN SYSTOLIC)   HAS RATE CONTROLLED A FIB   Pulmonary:      Effort: Pulmonary effort is normal. No respiratory distress.      Breath sounds: Normal breath sounds. No wheezing.   Abdominal:      General: Bowel sounds are normal. There is no distension.      Palpations: Abdomen is soft.      Tenderness: There is no abdominal tenderness.   Musculoskeletal:         General: No deformity. Normal range of motion.   Skin:     General: Skin is warm and dry.      Capillary Refill: Capillary refill takes less than 2 seconds.   Neurological:      General: No focal deficit present.      Mental Status: She is alert and oriented to person, place, and time.   Psychiatric:         Mood and Affect: Mood normal.         Assessment/Plan   Diagnoses and all orders for this visit:  Systolic murmur  -     CBC and Auto Differential  RSV bronchitis  -     CBC and Auto Differential  -     Comprehensive Metabolic Panel  -     C-reactive protein  Pain in both upper extremities  -     Protime-INR  Arm bruise, left, subsequent encounter

## 2024-03-08 NOTE — ED PROVIDER NOTES
HPI   Chief Complaint   Patient presents with    Arm Pain     Bi lat arm pain no injury       History of present illness:  73-year-old female presents the emergency room for complaints of bilateral arm pain.  The patient states that beginning earlier this week she began developing pain in the left arm and nondominant right arm.  She states it hurts sometimes when she lifts her left arm particularly above her head above the shoulder side.  She states that there is some pain in the left shoulder as well.  She denies any chest pain or shortness of breath or falls or injuries or any other symptoms at this time.  She states that she has also noticed that she has had some bruising on her left forearm and she is unsure where it came from.  She states she does not recall injuring it or striking on anything.  She denies any prior liver conditions or any excessive alcohol consumption.  She denies any other symptoms at this time.    Social history: Negative for alcohol and drug use.    Review of systems:   Gen.: No weight loss, fatigue, anorexia, insomnia, fever.   Eyes: No vision loss, double vision  ENT: No pharyngitis, headache  Cardiac: No chest pain, palpitations, syncope, near syncope.   Pulmonary: No shortness of breath, cough, hemoptysis.   Heme/lymph: No swollen glands, fever, bleeding.   GI: No abdominal pain, change in bowel habits, melena, hematemesis, hematochezia, nausea, vomiting, diarrhea.   : No discharge, dysuria, frequency, urgency, hematuria.   Musculoskeletal: No joint pain, joint swelling.   Skin: No rashes.   Review of systems is otherwise negative unless stated above or in history of present illness.        Physical exam:  General: Vitals noted, no distress. Afebrile.   EENT: No axial loading, no pain to palpation of cervical spine, no step-off appreciated  Cardiac: Regular, rate, rhythm, no murmur.   Pulmonary: Lungs clear bilaterally with good aeration. No adventitious breath sounds.   Abdomen:  Soft, nonsurgical. Nontender. No peritoneal signs. Normoactive bowel sounds.   Extremities: No peripheral edema.   Skin: No rash.   Neuro: No focal neurologic deficits        Medical decision making:   Testing: CT scan cervical spine without contrast shows degenerative changes throughout the neck but no fracture seen, chest x-ray was also performed which showed large hiatal hernia but no other acute findings, all imaging interpreted by radiology, INR unremarkable troponin CBC CMP unremarkable  Plan: Home-going.  Discussed differential. Will follow-up with the primary physician in the next 2-3 days. Return if worse. They understand return precautions and discharge instructions. Patient and family/friend/caregiver are in agreement with this plan. 73-year-old female presents the emergency room for complaints of bilateral arm pain.  The patient states that beginning earlier this week she began developing pain in the left arm and nondominant right arm.  She states it hurts sometimes when she lifts her left arm particularly above her head above the shoulder side.  She states that there is some pain in the left shoulder as well.  She denies any chest pain or shortness of breath or falls or injuries or any other symptoms at this time.  She states that she has also noticed that she has had some bruising on her left forearm and she is unsure where it came from.  She states she does not recall injuring it or striking on anything.  She denies any prior liver conditions or any excessive alcohol consumption.  She denies any other symptoms at this time. EENT: No axial loading, no pain to palpation of cervical spine, no step-off appreciated.  The patient is able to move both of her arms but she does complain of pain radiating from her shoulder into her neck slightly when she raises her left arm above her shoulder.  She has full range of motion otherwise.  Some old bruising is present on the forearm but appears to be healing at this  time.  Laboratory testing is reassuring.  Imaging is also reassuring.  Explained to the patient that be sending her home this time for short course of prednisone as I believe she is suffering from cervical radiculopathy.  I encouraged her to please return if she has any worsening symptoms.  Impression:   1. Cervical radiculopathy             History provided by:  Patient   used: No                        [place] Coma Scale Score: 15                     Patient History   Past Medical History:   Diagnosis Date    Acute cystitis without hematuria 08/28/2020    Acute cystitis without hematuria    Acute nasopharyngitis (common cold) 12/06/2016    Acute rhinitis    Contusion of lower back and pelvis, initial encounter 02/15/2017    Contusion of buttock, initial encounter    Encounter for other general examination 08/25/2020    Encounter for medical clearance for patient hold    Low back pain, unspecified 02/15/2017    Acute low back pain due to trauma     Past Surgical History:   Procedure Laterality Date    HYSTERECTOMY  08/27/2020    Hysterectomy    OTHER SURGICAL HISTORY  03/29/2022    Eye surgery    OTHER SURGICAL HISTORY  10/19/2021    Cataract surgery     Family History   Problem Relation Name Age of Onset    Alzheimer's disease Mother      Brain cancer Father      Other (Cardiac disorder) Sister Rose     Stroke Sister Rose         CVA    Diabetes Sister Rose     No Known Problems Brother Marin      Social History     Tobacco Use    Smoking status: Never    Smokeless tobacco: Never   Vaping Use    Vaping Use: Never used   Substance Use Topics    Alcohol use: Not Currently    Drug use: Never       Physical Exam   ED Triage Vitals [03/08/24 1502]   Temperature Heart Rate Respirations BP   36.6 °C (97.9 °F) 82 14 129/83      Pulse Ox Temp Source Heart Rate Source Patient Position   95 % Tympanic -- Sitting      BP Location FiO2 (%)     Right arm --       Physical Exam    ED Course & MDM    Diagnoses as of 03/08/24 1851   Cervical radiculopathy       Medical Decision Making      Procedure  Procedures     Paulo Ward PA-C  03/08/24 1857

## 2024-03-08 NOTE — PATIENT INSTRUCTIONS
LABS TAKEN  CONTACTED CARDIOLOGY AND THEY FEEL PATIENT SHOULD GO TO THE  ER TO FURTHER TESTING   LABS TAKEN HERE ARE CANCELED  I CALLED [facility] AND SPOKE WITH DR VILLALBA

## 2024-03-13 ENCOUNTER — TELEPHONE (OUTPATIENT)
Dept: PRIMARY CARE | Facility: CLINIC | Age: 74
End: 2024-03-13
Payer: MEDICARE

## 2024-03-19 DIAGNOSIS — F32.A DEPRESSION, UNSPECIFIED: ICD-10-CM

## 2024-03-19 DIAGNOSIS — I10 ESSENTIAL (PRIMARY) HYPERTENSION: ICD-10-CM

## 2024-03-19 RX ORDER — CITALOPRAM 40 MG/1
40 TABLET, FILM COATED ORAL DAILY
Qty: 90 TABLET | Refills: 1 | Status: SHIPPED | OUTPATIENT
Start: 2024-03-19 | End: 2024-06-10 | Stop reason: WASHOUT

## 2024-03-19 RX ORDER — HYDROCHLOROTHIAZIDE 25 MG/1
TABLET ORAL
Qty: 90 TABLET | Refills: 1 | Status: SHIPPED | OUTPATIENT
Start: 2024-03-19 | End: 2024-06-10 | Stop reason: WASHOUT

## 2024-04-13 DIAGNOSIS — I48.0 PAROXYSMAL ATRIAL FIBRILLATION (MULTI): ICD-10-CM

## 2024-04-15 RX ORDER — METOPROLOL TARTRATE 25 MG/1
12.5 TABLET, FILM COATED ORAL 2 TIMES DAILY
Qty: 30 TABLET | Refills: 6 | Status: SHIPPED | OUTPATIENT
Start: 2024-04-15 | End: 2024-06-10 | Stop reason: WASHOUT

## 2024-05-14 ENCOUNTER — APPOINTMENT (OUTPATIENT)
Dept: PODIATRY | Facility: CLINIC | Age: 74
End: 2024-05-14
Payer: MEDICARE

## 2024-05-15 ENCOUNTER — TELEPHONE (OUTPATIENT)
Dept: PRIMARY CARE | Facility: CLINIC | Age: 74
End: 2024-05-15
Payer: MEDICARE

## 2024-05-15 NOTE — TELEPHONE ENCOUNTER
Shona called back and states she is still admitted in [facility]. She is not sure when she will be discharged. States they found multiple blood clots in her lungs

## 2024-05-15 NOTE — TELEPHONE ENCOUNTER
Transition of Care    Inpatient facility: [facility]  Discharge diagnosis: NON STEMI  Discharged to: HOME  Discharge date: 5/14/24  Initial Call date: 5/15/24  Spoke with patient/caregiver: SPOKE WITH POLINA, SHE WAS BUSY AT THE MOMENT AND WILL CALL BACK ONCE DONE

## 2024-05-29 ENCOUNTER — OFFICE VISIT (OUTPATIENT)
Dept: PRIMARY CARE | Facility: CLINIC | Age: 74
End: 2024-05-29
Payer: MEDICARE

## 2024-05-29 ENCOUNTER — PATIENT OUTREACH (OUTPATIENT)
Dept: CARE COORDINATION | Facility: CLINIC | Age: 74
End: 2024-05-29

## 2024-05-29 VITALS
SYSTOLIC BLOOD PRESSURE: 106 MMHG | WEIGHT: 183 LBS | HEART RATE: 84 BPM | OXYGEN SATURATION: 100 % | TEMPERATURE: 97.9 F | DIASTOLIC BLOOD PRESSURE: 58 MMHG | BODY MASS INDEX: 29.54 KG/M2

## 2024-05-29 DIAGNOSIS — I26.99 PULMONARY EMBOLISM, UNSPECIFIED CHRONICITY, UNSPECIFIED PULMONARY EMBOLISM TYPE, UNSPECIFIED WHETHER ACUTE COR PULMONALE PRESENT (MULTI): Primary | ICD-10-CM

## 2024-05-29 PROCEDURE — 1036F TOBACCO NON-USER: CPT | Performed by: FAMILY MEDICINE

## 2024-05-29 PROCEDURE — 99496 TRANSJ CARE MGMT HIGH F2F 7D: CPT | Performed by: FAMILY MEDICINE

## 2024-05-29 PROCEDURE — 1159F MED LIST DOCD IN RCRD: CPT | Performed by: FAMILY MEDICINE

## 2024-05-29 PROCEDURE — 3078F DIAST BP <80 MM HG: CPT | Performed by: FAMILY MEDICINE

## 2024-05-29 PROCEDURE — 3074F SYST BP LT 130 MM HG: CPT | Performed by: FAMILY MEDICINE

## 2024-05-29 RX ORDER — DORZOLAMIDE HYDROCHLORIDE AND TIMOLOL MALEATE 20; 5 MG/ML; MG/ML
1 SOLUTION/ DROPS OPHTHALMIC 2 TIMES DAILY
COMMUNITY
Start: 2024-03-22 | End: 2024-05-29 | Stop reason: WASHOUT

## 2024-05-29 RX ORDER — WARFARIN 4 MG/1
4 TABLET ORAL
COMMUNITY
Start: 2024-05-27

## 2024-05-29 ASSESSMENT — ENCOUNTER SYMPTOMS
LIGHT-HEADEDNESS: 0
WHEEZING: 0
PALPITATIONS: 0
FEVER: 0
ABDOMINAL PAIN: 0
DYSPHORIC MOOD: 0
EYE DISCHARGE: 0
EYE ITCHING: 0
COUGH: 0
BLOOD IN STOOL: 0
CONSTIPATION: 0
HEADACHES: 0
NAUSEA: 0
SHORTNESS OF BREATH: 0
FLANK PAIN: 0
HEMATURIA: 0
UNEXPECTED WEIGHT CHANGE: 0
JOINT SWELLING: 0
DYSURIA: 0
SORE THROAT: 0
DIARRHEA: 0
ACTIVITY CHANGE: 0
RHINORRHEA: 0
NUMBNESS: 0
ARTHRALGIAS: 0
MYALGIAS: 0
VOMITING: 0
WEAKNESS: 0
NERVOUS/ANXIOUS: 0
DIZZINESS: 0
FATIGUE: 1
SLEEP DISTURBANCE: 0
SINUS PRESSURE: 0
APPETITE CHANGE: 0

## 2024-05-29 NOTE — PROGRESS NOTES
"Subjective   Patient ID: Shona Maradiaga is a 73 y.o. female who presents for Hospital Follow-up ([facility]- FOR SOB, CHEST PAIN, AND SWEATS.  EMS CAME AND BP WAS 70/20 [facility]- BLOOD CLOTS IN LUNGS AND R LEG. /CURRENT CONCERN- DISORIENTED SINCE SHE GOT HOME. ) and Medication Question (BP at home on the lower end. Not currently on BP meds. Would like to discuss.  Also, was told by EMS that she should be taking a baby ASA.).    HPI SEVERAL WEEKS FROM [facility] TO [facility] WITH PE AND RESPIRATORY FAILURE   NOW ON COUMADIN   WILL FOLLOW WITH DR MAGANA,CARDIOLOGY   SUSPECT THE COUMADIN CLINIC FROM [facility]   \"FEELS LIKE HAVING AN OUT OF BODY EXPERIENCE \"    Review of Systems   Constitutional:  Positive for fatigue. Negative for activity change, appetite change, fever and unexpected weight change.   HENT:  Negative for congestion, ear pain, postnasal drip, rhinorrhea, sinus pressure and sore throat.    Eyes:  Negative for discharge, itching and visual disturbance.   Respiratory:  Negative for cough, shortness of breath and wheezing.    Cardiovascular:  Negative for chest pain, palpitations and leg swelling.        NO CHEST PAIN   NOT SOB   USING INCENTIVE SPIROMETER    Gastrointestinal:  Negative for abdominal pain, blood in stool, constipation, diarrhea, nausea and vomiting.   Endocrine: Negative for cold intolerance, heat intolerance and polyuria.   Genitourinary:  Negative for dysuria, flank pain and hematuria.   Musculoskeletal:  Negative for arthralgias, gait problem, joint swelling and myalgias.   Skin:  Negative for rash.   Allergic/Immunologic: Negative for environmental allergies and food allergies.   Neurological:  Negative for dizziness, syncope, weakness, light-headedness, numbness and headaches.   Psychiatric/Behavioral:  Negative for dysphoric mood and sleep disturbance. The patient is not nervous/anxious.        Objective   /58   Pulse 84   Temp 36.6 °C (97.9 °F)   Wt 83 kg (183 lb)   SpO2 100%   BMI " 29.54 kg/m²     Physical Exam  Vitals and nursing note reviewed.   Constitutional:       Appearance: Normal appearance.   HENT:      Head: Normocephalic.   Cardiovascular:      Rate and Rhythm: Normal rate. Rhythm irregular.      Pulses: Normal pulses.      Heart sounds: Normal heart sounds. No murmur heard.     No friction rub. No gallop.   Pulmonary:      Effort: Pulmonary effort is normal. No respiratory distress.      Breath sounds: Normal breath sounds. No wheezing.   Abdominal:      General: There is no distension.      Tenderness: There is no abdominal tenderness.   Musculoskeletal:         General: No deformity. Normal range of motion.   Skin:     General: Skin is warm and dry.      Capillary Refill: Capillary refill takes less than 2 seconds.   Neurological:      General: No focal deficit present.      Mental Status: She is alert and oriented to person, place, and time.   Psychiatric:         Mood and Affect: Mood normal.         Assessment/Plan   Problem List Items Addressed This Visit    None  Visit Diagnoses         Codes    Pulmonary embolism, unspecified chronicity, unspecified pulmonary embolism type, unspecified whether acute cor pulmonale present (Multi)    -  Primary I26.99

## 2024-05-30 ENCOUNTER — PATIENT OUTREACH (OUTPATIENT)
Dept: CARE COORDINATION | Facility: CLINIC | Age: 74
End: 2024-05-30
Payer: MEDICARE

## 2024-05-30 NOTE — PROGRESS NOTES
Outreach call to patient to support a smooth transition of care from recent admission.  Spoke with patient, reviewed discharge medications, discharge instructions, assessed social needs, and provided education on importance of follow-up appointment with provider.  Will continue to monitor through transition period.    Engagement  Call Start Time: 1350 (5/30/2024  1:58 PM)    Medications  Medications reviewed with patient/caregiver?: Yes (5/30/2024  1:58 PM)  Is the patient having any side effects they believe may be caused by any medication additions or changes?: No (5/30/2024  1:58 PM)  Does the patient have all medications ordered at discharge?: Yes (5/30/2024  1:58 PM)  Care Management Interventions: No intervention needed (5/30/2024  1:58 PM)  Is the patient taking all medications as directed (includes completed medication regime)?: Yes (5/30/2024  1:58 PM)    Appointments  Does the patient have a primary care provider?: Yes (5/30/2024  1:58 PM)  Care Management Interventions: Verified appointment date/time/provider (5/30/2024  1:58 PM)  Has the patient kept scheduled appointments due by today?: Yes (5/30/2024  1:58 PM)    Self Management  What is the home health agency?: None (5/30/2024  1:58 PM)  What Durable Medical Equipment (DME) was ordered?: None (5/30/2024  1:58 PM)    Patient Teaching  Does the patient have access to their discharge instructions?: Yes (5/30/2024  1:58 PM)  Care Management Interventions: Reviewed instructions with patient (5/30/2024  1:58 PM)  What is the patient's perception of their health status since discharge?: Improving (5/30/2024  1:58 PM)  Is the patient/caregiver able to teach back the hierarchy of who to call/visit for symptoms/problems? PCP, Specialist, Home Health nurse, Urgent Care, ED, 911: Yes (5/30/2024  1:58 PM)    Wrap Up  Wrap Up Additional Comments: CM spoke to patient states she is doing well at no c/o sob, edema.  Patient states she is able to complete ADLs, rest  between activities.  Patient seen PCP yesterday for hospital follow up.  Patient awaiting call from Cardiologist to find out can she restart medications since starting coumadin and leaving the hospital.  PCP is aware patient is currently not taking any meds except coumadin which PCP told patient to contact cardiology.  Patient is aware of upcoming appointments.  Patient has no needs or questions at this time.  CM will continue transitions of care  follow up.   AMELIE Sevilla, RN (5/30/2024  1:58 PM)  Call End Time: 1400 (5/30/2024  1:58 PM)

## 2024-06-10 ENCOUNTER — OFFICE VISIT (OUTPATIENT)
Dept: PRIMARY CARE | Facility: CLINIC | Age: 74
End: 2024-06-10
Payer: MEDICARE

## 2024-06-10 VITALS
OXYGEN SATURATION: 98 % | SYSTOLIC BLOOD PRESSURE: 110 MMHG | HEART RATE: 71 BPM | DIASTOLIC BLOOD PRESSURE: 70 MMHG | TEMPERATURE: 96.8 F | BODY MASS INDEX: 30.59 KG/M2 | WEIGHT: 189.5 LBS

## 2024-06-10 DIAGNOSIS — I82.492 ACUTE DEEP VEIN THROMBOSIS (DVT) OF OTHER SPECIFIED VEIN OF LEFT LOWER EXTREMITY (MULTI): Primary | ICD-10-CM

## 2024-06-10 DIAGNOSIS — I48.0 AF (PAROXYSMAL ATRIAL FIBRILLATION) (MULTI): ICD-10-CM

## 2024-06-10 DIAGNOSIS — I35.0 AORTIC VALVE STENOSIS, MILD: ICD-10-CM

## 2024-06-10 PROCEDURE — 3078F DIAST BP <80 MM HG: CPT | Performed by: FAMILY MEDICINE

## 2024-06-10 PROCEDURE — 99214 OFFICE O/P EST MOD 30 MIN: CPT | Performed by: FAMILY MEDICINE

## 2024-06-10 PROCEDURE — 1159F MED LIST DOCD IN RCRD: CPT | Performed by: FAMILY MEDICINE

## 2024-06-10 PROCEDURE — 1036F TOBACCO NON-USER: CPT | Performed by: FAMILY MEDICINE

## 2024-06-10 PROCEDURE — 3074F SYST BP LT 130 MM HG: CPT | Performed by: FAMILY MEDICINE

## 2024-06-10 RX ORDER — WARFARIN 2 MG/1
TABLET ORAL
Qty: 30 TABLET | Refills: 3 | Status: SHIPPED | OUTPATIENT
Start: 2024-06-10 | End: 2025-06-10

## 2024-06-10 ASSESSMENT — ENCOUNTER SYMPTOMS
JOINT SWELLING: 0
UNEXPECTED WEIGHT CHANGE: 0
FEVER: 0
NUMBNESS: 0
ARTHRALGIAS: 0
CONSTIPATION: 0
BLOOD IN STOOL: 0
RHINORRHEA: 0
MYALGIAS: 0
DYSPHORIC MOOD: 0
WHEEZING: 0
SLEEP DISTURBANCE: 0
NERVOUS/ANXIOUS: 0
EYE ITCHING: 0
VOMITING: 0
HEMATURIA: 0
FLANK PAIN: 0
DIZZINESS: 0
DIARRHEA: 0
COUGH: 0
WEAKNESS: 0
APPETITE CHANGE: 0
SHORTNESS OF BREATH: 0
ACTIVITY CHANGE: 0
HEADACHES: 0
EYE DISCHARGE: 0
NAUSEA: 0
SORE THROAT: 0
PALPITATIONS: 0
DYSURIA: 0
ABDOMINAL PAIN: 0
SINUS PRESSURE: 0
LIGHT-HEADEDNESS: 0

## 2024-06-10 NOTE — PROGRESS NOTES
Subjective   Patient ID: Shona Maradiaga is a 73 y.o. female who presents for Leg Swelling (L leg swelling primarily in L ankle. Noticed swelling Saturday).    HPI PATIENT JUST OUT OF THE HOSPITAL WITH DIAGNOSIS OF PE   SHE IS ON COUMADIN 4 MGS DAILY   Recent adjuatments being made     Review of Systems   Constitutional:  Negative for activity change, appetite change, fever and unexpected weight change.   HENT:  Negative for congestion, ear pain, postnasal drip, rhinorrhea, sinus pressure and sore throat.    Eyes:  Negative for discharge, itching and visual disturbance.   Respiratory:  Negative for cough, shortness of breath and wheezing.    Cardiovascular:  Positive for leg swelling. Negative for chest pain and palpitations.        PE SEVERAL WEEKS AGO /IN RESPIRATORY FAILURE   THAT IS RESOLVED NOW    Gastrointestinal:  Negative for abdominal pain, blood in stool, constipation, diarrhea, nausea and vomiting.   Endocrine: Negative for cold intolerance, heat intolerance and polyuria.   Genitourinary:  Negative for dysuria, flank pain and hematuria.   Musculoskeletal:  Negative for arthralgias, gait problem, joint swelling and myalgias.   Skin:  Negative for rash.   Allergic/Immunologic: Negative for environmental allergies and food allergies.   Neurological:  Negative for dizziness, syncope, weakness, light-headedness, numbness and headaches.   Psychiatric/Behavioral:  Negative for dysphoric mood and sleep disturbance. The patient is not nervous/anxious.        Objective   /70   Pulse 71   Temp 36 °C (96.8 °F) (Temporal)   Wt 86 kg (189 lb 8 oz)   SpO2 98%   BMI 30.59 kg/m²     Physical Exam  Vitals and nursing note reviewed.   Constitutional:       Appearance: Normal appearance.   HENT:      Head: Normocephalic.   Cardiovascular:      Rate and Rhythm: Normal rate and regular rhythm.      Pulses: Normal pulses.      Heart sounds: Normal heart sounds. No murmur heard.     No friction rub. No gallop.       Comments: RATE CONTROLLED A FIB   Pulmonary:      Effort: Pulmonary effort is normal. No respiratory distress.      Breath sounds: Normal breath sounds. No wheezing.   Abdominal:      General: There is no distension.      Tenderness: There is no abdominal tenderness.   Musculoskeletal:         General: No deformity. Normal range of motion.      Left lower leg: Edema present.      Comments: PAINFUL SWELLING LEFT LEG    Skin:     General: Skin is warm and dry.      Capillary Refill: Capillary refill takes less than 2 seconds.   Neurological:      General: No focal deficit present.      Mental Status: She is alert and oriented to person, place, and time.   Psychiatric:         Mood and Affect: Mood normal.       Assessment/Plan   Problem List Items Addressed This Visit    None  Visit Diagnoses         Codes    Acute deep vein thrombosis (DVT) of other specified vein of left lower extremity (Multi)    -  Primary I82.492    Relevant Orders    Vascular US lower extremity venous duplex bilateral

## 2024-07-05 ENCOUNTER — TELEPHONE (OUTPATIENT)
Dept: PRIMARY CARE | Facility: CLINIC | Age: 74
End: 2024-07-05
Payer: MEDICARE

## 2024-07-05 DIAGNOSIS — I48.0 AF (PAROXYSMAL ATRIAL FIBRILLATION) (MULTI): Primary | ICD-10-CM

## 2024-07-08 RX ORDER — WARFARIN 4 MG/1
4 TABLET ORAL EVERY OTHER DAY
Qty: 30 TABLET | Refills: 2 | Status: SHIPPED | OUTPATIENT
Start: 2024-07-08

## 2024-07-12 ENCOUNTER — OFFICE VISIT (OUTPATIENT)
Dept: PRIMARY CARE | Facility: CLINIC | Age: 74
End: 2024-07-12
Payer: MEDICARE

## 2024-07-12 VITALS
BODY MASS INDEX: 29.86 KG/M2 | WEIGHT: 185 LBS | TEMPERATURE: 97.6 F | DIASTOLIC BLOOD PRESSURE: 80 MMHG | HEART RATE: 84 BPM | OXYGEN SATURATION: 96 % | SYSTOLIC BLOOD PRESSURE: 116 MMHG

## 2024-07-12 DIAGNOSIS — I87.8 STASIS, VENOUS: Primary | ICD-10-CM

## 2024-07-12 DIAGNOSIS — I82.5Y2 CHRONIC DEEP VEIN THROMBOSIS (DVT) OF PROXIMAL VEIN OF LEFT LOWER EXTREMITY (MULTI): ICD-10-CM

## 2024-07-12 PROCEDURE — 99214 OFFICE O/P EST MOD 30 MIN: CPT | Performed by: FAMILY MEDICINE

## 2024-07-12 PROCEDURE — 1036F TOBACCO NON-USER: CPT | Performed by: FAMILY MEDICINE

## 2024-07-12 PROCEDURE — 1159F MED LIST DOCD IN RCRD: CPT | Performed by: FAMILY MEDICINE

## 2024-07-12 PROCEDURE — 3079F DIAST BP 80-89 MM HG: CPT | Performed by: FAMILY MEDICINE

## 2024-07-12 PROCEDURE — 3074F SYST BP LT 130 MM HG: CPT | Performed by: FAMILY MEDICINE

## 2024-07-12 RX ORDER — BRIMONIDINE TARTRATE 2 MG/ML
SOLUTION/ DROPS OPHTHALMIC
COMMUNITY
Start: 2024-06-18

## 2024-07-12 RX ORDER — METOPROLOL TARTRATE 25 MG/1
12.5 TABLET, FILM COATED ORAL 2 TIMES DAILY
COMMUNITY
Start: 2024-06-18

## 2024-07-12 ASSESSMENT — ENCOUNTER SYMPTOMS
LIGHT-HEADEDNESS: 0
RHINORRHEA: 0
ACTIVITY CHANGE: 0
WEAKNESS: 0
JOINT SWELLING: 0
FEVER: 0
NUMBNESS: 0
SLEEP DISTURBANCE: 0
UNEXPECTED WEIGHT CHANGE: 0
COUGH: 0
EYE ITCHING: 0
DYSURIA: 0
BLOOD IN STOOL: 0
PALPITATIONS: 0
SORE THROAT: 0
HEMATURIA: 0
MYALGIAS: 0
VOMITING: 0
ARTHRALGIAS: 0
DYSPHORIC MOOD: 0
FLANK PAIN: 0
SHORTNESS OF BREATH: 0
NAUSEA: 0
CONSTIPATION: 0
HEADACHES: 0
WHEEZING: 0
ABDOMINAL PAIN: 0
EYE DISCHARGE: 0
NERVOUS/ANXIOUS: 0
DIARRHEA: 0
SINUS PRESSURE: 0
DIZZINESS: 0
APPETITE CHANGE: 0

## 2024-07-12 NOTE — PROGRESS NOTES
Subjective   Patient ID: Shona Maradiaga is a 74 y.o. female who presents for Leg Swelling (L leg continued swelling- requesting a water tablet).    HPI PATIENT WEARING A 15 MMHG PRESSURE HOSE ON THE LEFT  THE HOSE IS TOO TALL AND ROLLS DOWN CAUSING SOME CONSTRICTION AT THE LARGER PORTION OF HER LEG  THIS COULD BE SOME OF THE SWELLING WHICH IS SO MUCH BETTER THAN IT WAS BEFORE COMPRESSION STOCKINGS.  TENDER TO PALPATE LOWER BUT NO EVIDENCE OF WORSENING DVT   FOLLOWS DR KIRK AND HE IS AWARE OF HER LEG AND THE EXTENSIVE DVT      Review of Systems   Constitutional:  Negative for activity change, appetite change, fever and unexpected weight change.   HENT:  Negative for congestion, ear pain, postnasal drip, rhinorrhea, sinus pressure and sore throat.    Eyes:  Negative for discharge, itching and visual disturbance.   Respiratory:  Negative for cough, shortness of breath and wheezing.         NO CHEST PAIN OR SOB    Cardiovascular:  Positive for leg swelling. Negative for chest pain and palpitations.        LEFT AND DISCUSSED EXTENSIVELY IN HISTORY OF PRESENT ILLNESS    Gastrointestinal:  Negative for abdominal pain, blood in stool, constipation, diarrhea, nausea and vomiting.   Endocrine: Negative for cold intolerance, heat intolerance and polyuria.   Genitourinary:  Negative for dysuria, flank pain and hematuria.   Musculoskeletal:  Negative for arthralgias, gait problem, joint swelling and myalgias.   Skin:  Negative for rash.   Allergic/Immunologic: Negative for environmental allergies and food allergies.   Neurological:  Negative for dizziness, syncope, weakness, light-headedness, numbness and headaches.   Psychiatric/Behavioral:  Negative for dysphoric mood and sleep disturbance. The patient is not nervous/anxious.        Objective   /80   Pulse 84   Temp 36.4 °C (97.6 °F)   Wt 83.9 kg (185 lb)   SpO2 96%   BMI 29.86 kg/m²     Physical Exam  Vitals and nursing note reviewed.   Constitutional:        Appearance: Normal appearance.   HENT:      Head: Normocephalic.   Cardiovascular:      Rate and Rhythm: Normal rate and regular rhythm.      Pulses: Normal pulses.      Heart sounds: Normal heart sounds. No murmur heard.     No friction rub. No gallop.   Pulmonary:      Effort: Pulmonary effort is normal. No respiratory distress.      Breath sounds: Normal breath sounds. No wheezing.   Abdominal:      General: There is no distension.      Tenderness: There is no abdominal tenderness.   Musculoskeletal:         General: No deformity. Normal range of motion.      Left lower leg: Edema present.   Skin:     General: Skin is warm and dry.      Capillary Refill: Capillary refill takes less than 2 seconds.   Neurological:      General: No focal deficit present.      Mental Status: She is alert and oriented to person, place, and time.   Psychiatric:         Mood and Affect: Mood normal.         Assessment/Plan   Problem List Items Addressed This Visit             ICD-10-CM    Stasis, venous - Primary I87.8     Other Visit Diagnoses         Codes    Chronic deep vein thrombosis (DVT) of proximal vein of left lower extremity (Multi)     I82.5Y2

## 2024-07-29 DIAGNOSIS — I82.422 ACUTE DEEP VEIN THROMBOSIS (DVT) OF ILIAC VEIN OF LEFT LOWER EXTREMITY (MULTI): Primary | ICD-10-CM

## 2024-07-29 NOTE — PROGRESS NOTES
SPOKE WITH PATIENT AND WORSENING PAIN AND SWELLING OF HER LEFT LEG  KNOW DVT   COUMADIN IS SUBTHERAPEUTIC

## 2024-09-13 DIAGNOSIS — I48.0 AF (PAROXYSMAL ATRIAL FIBRILLATION) (MULTI): ICD-10-CM

## 2024-09-13 RX ORDER — WARFARIN 4 MG/1
4 TABLET ORAL EVERY OTHER DAY
Qty: 30 TABLET | Refills: 2 | Status: SHIPPED | OUTPATIENT
Start: 2024-09-13

## 2024-10-01 DIAGNOSIS — F32.A DEPRESSION, UNSPECIFIED: ICD-10-CM

## 2024-10-02 RX ORDER — CITALOPRAM 40 MG/1
40 TABLET, FILM COATED ORAL DAILY
Qty: 90 TABLET | Refills: 1 | Status: SHIPPED | OUTPATIENT
Start: 2024-10-02

## 2024-11-06 DIAGNOSIS — I48.0 AF (PAROXYSMAL ATRIAL FIBRILLATION) (MULTI): ICD-10-CM

## 2024-11-06 RX ORDER — WARFARIN 4 MG/1
4 TABLET ORAL EVERY OTHER DAY
Qty: 30 TABLET | Refills: 2 | Status: SHIPPED | OUTPATIENT
Start: 2024-11-06

## 2025-01-23 DIAGNOSIS — I48.0 AF (PAROXYSMAL ATRIAL FIBRILLATION) (MULTI): ICD-10-CM

## 2025-01-24 RX ORDER — WARFARIN 4 MG/1
4 TABLET ORAL EVERY EVENING
Qty: 90 TABLET | Refills: 3 | Status: SHIPPED | OUTPATIENT
Start: 2025-01-24 | End: 2026-01-19

## 2025-01-27 ENCOUNTER — APPOINTMENT (OUTPATIENT)
Dept: PRIMARY CARE | Facility: CLINIC | Age: 75
End: 2025-01-27
Payer: MEDICARE

## 2025-01-27 VITALS
DIASTOLIC BLOOD PRESSURE: 74 MMHG | WEIGHT: 189.3 LBS | HEART RATE: 72 BPM | TEMPERATURE: 96.7 F | SYSTOLIC BLOOD PRESSURE: 112 MMHG | OXYGEN SATURATION: 98 % | BODY MASS INDEX: 31.54 KG/M2 | HEIGHT: 65 IN

## 2025-01-27 DIAGNOSIS — Z00.00 ROUTINE GENERAL MEDICAL EXAMINATION AT HEALTH CARE FACILITY: Primary | ICD-10-CM

## 2025-01-27 DIAGNOSIS — Z12.31 ENCOUNTER FOR SCREENING MAMMOGRAM FOR BREAST CANCER: ICD-10-CM

## 2025-01-27 PROCEDURE — 1170F FXNL STATUS ASSESSED: CPT | Performed by: FAMILY MEDICINE

## 2025-01-27 PROCEDURE — G0439 PPPS, SUBSEQ VISIT: HCPCS | Performed by: FAMILY MEDICINE

## 2025-01-27 PROCEDURE — 3078F DIAST BP <80 MM HG: CPT | Performed by: FAMILY MEDICINE

## 2025-01-27 PROCEDURE — 1159F MED LIST DOCD IN RCRD: CPT | Performed by: FAMILY MEDICINE

## 2025-01-27 PROCEDURE — 3074F SYST BP LT 130 MM HG: CPT | Performed by: FAMILY MEDICINE

## 2025-01-27 PROCEDURE — 3008F BODY MASS INDEX DOCD: CPT | Performed by: FAMILY MEDICINE

## 2025-01-27 RX ORDER — DORZOLAMIDE HYDROCHLORIDE AND TIMOLOL MALEATE 20; 5 MG/ML; MG/ML
1 SOLUTION/ DROPS OPHTHALMIC 2 TIMES DAILY
COMMUNITY
Start: 2024-11-01

## 2025-01-27 ASSESSMENT — PATIENT HEALTH QUESTIONNAIRE - PHQ9
2. FEELING DOWN, DEPRESSED OR HOPELESS: NOT AT ALL
SUM OF ALL RESPONSES TO PHQ9 QUESTIONS 1 AND 2: 0
1. LITTLE INTEREST OR PLEASURE IN DOING THINGS: NOT AT ALL

## 2025-01-27 ASSESSMENT — ENCOUNTER SYMPTOMS
LOSS OF SENSATION IN FEET: 0
UNEXPECTED WEIGHT CHANGE: 0
NAUSEA: 0
WEAKNESS: 0
JOINT SWELLING: 0
DYSPHORIC MOOD: 0
EYE ITCHING: 0
NUMBNESS: 0
CONSTIPATION: 0
APPETITE CHANGE: 0
DIARRHEA: 0
DIZZINESS: 0
FEVER: 0
COUGH: 0
FLANK PAIN: 0
RHINORRHEA: 0
PALPITATIONS: 1
OCCASIONAL FEELINGS OF UNSTEADINESS: 0
NERVOUS/ANXIOUS: 0
EYE DISCHARGE: 0
SORE THROAT: 0
HEADACHES: 0
ACTIVITY CHANGE: 0
SINUS PRESSURE: 0
MYALGIAS: 0
VOMITING: 0
HEMATURIA: 0
BLOOD IN STOOL: 0
ABDOMINAL PAIN: 0
LIGHT-HEADEDNESS: 0
WHEEZING: 0
DYSURIA: 0
SHORTNESS OF BREATH: 0
DEPRESSION: 0
SLEEP DISTURBANCE: 0

## 2025-01-27 ASSESSMENT — ACTIVITIES OF DAILY LIVING (ADL)
BATHING: INDEPENDENT
TAKING_MEDICATION: INDEPENDENT
MANAGING_FINANCES: INDEPENDENT
DRESSING: INDEPENDENT
DOING_HOUSEWORK: INDEPENDENT
GROCERY_SHOPPING: INDEPENDENT

## 2025-01-27 NOTE — PATIENT INSTRUCTIONS
CONSIDER SHINGLES AND RSV   DISCUSSED COLON CANCER SCREENING AND THIS IS AGED OUT   MAMMOGRAM IS ORDERED   YEARLY WELLNESS

## 2025-01-27 NOTE — PROGRESS NOTES
"Subjective   Reason for Visit: Leann Maradiaga is an 74 y.o. female here for a Medicare Wellness visit.     Past Medical, Surgical, and Family History reviewed and updated in chart.    Reviewed all medications by prescribing practitioner or clinical pharmacist (such as prescriptions, OTCs, herbal therapies and supplements) and documented in the medical record.    HPIYOU ARE DOING WELL  WITH THE CARDIAC ISSUES   [facility] [facility] [place] [facility] [place]  FOLLOWS AL BEE   HX VALVULAR HEART DISEASE AND DVT     Patient Care Team:  Lesley Spears DO as PCP - General  Lesley Spears DO as PCP - [employer] PCP     Review of Systems   Constitutional:  Negative for activity change, appetite change, fever and unexpected weight change.        WEIGHT IS STABLE BUT SHE IS OBESE    HENT:  Negative for congestion, ear pain, postnasal drip, rhinorrhea, sinus pressure and sore throat.    Eyes:  Positive for visual disturbance. Negative for discharge and itching.        S/P ISSUES WITH HER CATARACT SURGERY    Respiratory:  Negative for cough, shortness of breath and wheezing.    Cardiovascular:  Positive for palpitations. Negative for chest pain and leg swelling.        VALVULAR HEART DISEASE      Gastrointestinal:  Negative for abdominal pain, blood in stool, constipation, diarrhea, nausea and vomiting.   Endocrine: Negative for cold intolerance, heat intolerance and polyuria.   Genitourinary:  Negative for dysuria, flank pain and hematuria.   Musculoskeletal:  Positive for arthralgias. Negative for gait problem, joint swelling and myalgias.        OA KNEES AND THEY HURT 24-7   BUT \"I JUST KEEP GOING\" SHE SAYS    Skin:  Negative for rash.   Allergic/Immunologic: Negative for environmental allergies and food allergies.   Neurological:  Negative for dizziness, syncope, weakness, light-headedness, numbness and headaches.   Psychiatric/Behavioral:  Negative for dysphoric mood and sleep " "disturbance. The patient is not nervous/anxious.        Objective   Vitals:  /74 (BP Location: Left arm)   Pulse 72   Temp 35.9 °C (96.7 °F)   Ht 1.651 m (5' 5\")   Wt 85.9 kg (189 lb 4.8 oz)   SpO2 98%   BMI 31.50 kg/m²       Physical Exam  Vitals and nursing note reviewed.   Constitutional:       Appearance: Normal appearance.   HENT:      Head: Normocephalic.   Eyes:      Extraocular Movements: Extraocular movements intact.      Comments: VISION CHANGES SINCE CATARACT SURGERY    Cardiovascular:      Rate and Rhythm: Normal rate. Rhythm irregular.      Pulses: Normal pulses.      Heart sounds: Murmur heard.      No friction rub. No gallop.      Comments: AORTIC VALVE STENOSIS/LAST ECHO 12-23   \NO LVH   Pulmonary:      Effort: Pulmonary effort is normal. No respiratory distress.      Breath sounds: Normal breath sounds. No wheezing.   Abdominal:      General: There is no distension.      Palpations: Abdomen is soft.      Tenderness: There is no abdominal tenderness.   Musculoskeletal:         General: No deformity. Normal range of motion.   Skin:     General: Skin is warm and dry.      Capillary Refill: Capillary refill takes less than 2 seconds.   Neurological:      General: No focal deficit present.      Mental Status: She is alert and oriented to person, place, and time.   Psychiatric:         Mood and Affect: Mood normal.         Assessment & Plan  Routine general medical examination at health care facility    Orders:    1 Year Follow Up In Primary Care - Wellness Exam; Future    Encounter for screening mammogram for breast cancer    Orders:    BI mammo bilateral screening tomosynthesis; Future              "

## 2025-01-29 ASSESSMENT — ENCOUNTER SYMPTOMS: ARTHRALGIAS: 1

## 2025-01-30 ENCOUNTER — HOSPITAL ENCOUNTER (OUTPATIENT)
Dept: RADIOLOGY | Facility: HOSPITAL | Age: 75
Discharge: HOME | End: 2025-01-30
Payer: MEDICARE

## 2025-01-30 VITALS — BODY MASS INDEX: 31.49 KG/M2 | WEIGHT: 189 LBS | HEIGHT: 65 IN

## 2025-01-30 DIAGNOSIS — Z12.31 ENCOUNTER FOR SCREENING MAMMOGRAM FOR BREAST CANCER: ICD-10-CM

## 2025-01-30 PROCEDURE — 77067 SCR MAMMO BI INCL CAD: CPT | Performed by: RADIOLOGY

## 2025-01-30 PROCEDURE — 77063 BREAST TOMOSYNTHESIS BI: CPT | Performed by: RADIOLOGY

## 2025-01-30 PROCEDURE — 77067 SCR MAMMO BI INCL CAD: CPT

## 2025-03-31 DIAGNOSIS — F32.A DEPRESSION, UNSPECIFIED: ICD-10-CM

## 2025-03-31 RX ORDER — CITALOPRAM 40 MG/1
40 TABLET, FILM COATED ORAL DAILY
Qty: 90 TABLET | Refills: 1 | Status: SHIPPED | OUTPATIENT
Start: 2025-03-31

## 2026-01-30 ENCOUNTER — APPOINTMENT (OUTPATIENT)
Dept: PRIMARY CARE | Facility: CLINIC | Age: 76
End: 2026-01-30
Payer: MEDICARE